# Patient Record
Sex: MALE | Race: WHITE | NOT HISPANIC OR LATINO | URBAN - METROPOLITAN AREA
[De-identification: names, ages, dates, MRNs, and addresses within clinical notes are randomized per-mention and may not be internally consistent; named-entity substitution may affect disease eponyms.]

---

## 2017-10-02 NOTE — PATIENT DISCUSSION
(H25.509) Posterior subcapsular polar age-related cataract, bilateral - Assesment : Examination revealed Posterior Subcapsular Polar Senile Cataract. - Plan : see plan #1.

## 2017-10-02 NOTE — PATIENT DISCUSSION
(M66.042) Keratoconjunct sicca, not specified as Sjogren's, bilateral - Assesment : Examination revealed Dry Eye Syndrome OU. - Plan : Monitor for changes. Advised patient to call our office with decreased vision or increased symptoms.

## 2017-10-02 NOTE — PATIENT DISCUSSION
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 - Plan : Monitor for changes. Advised patient to call our office with decreased vision or increased symptoms.

## 2017-10-02 NOTE — PATIENT DISCUSSION
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 - Plan : Monitor for changes.

## 2017-10-02 NOTE — PATIENT DISCUSSION
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 - Plan : Risks, Benefits and Alternatives were discussed with patient at length for Cataract Surgery. Visual symptoms are consistent with Cataract findings on examination and current refraction no longer provides satisfactory vision. Hx of unsuccessful monovision with CL and do not recommend for after cataract surgery. EROF lenses discussed and advised that patient may need mild readers when prolonged reading or reading in dim lighting. Advised that symfony lenses may not be as sharp as a SV distance lens. Patient wants to be mostly independent of glasses. Minimal astigmatism and does not warrant toric lenses OU. Patient understands and desires surgery. All questions answered. Risks, Benefits and Alternatives discussed at length for IOL placement. Doctor suggests EROF lens. Patient desires EROF lens. Patient will need to wear a mild reader for fine or prolonged reading or reading in dim lighting. EYE: OS IOL TYPE: EROF lens  POST OPERATIVE TARGET: [place] PACKAGE:  ROF pkge  OD to follow. RV 1 week for Ascan/See GB.

## 2017-11-08 ENCOUNTER — IMPORTED ENCOUNTER (OUTPATIENT)
Dept: URBAN - METROPOLITAN AREA CLINIC 31 | Facility: CLINIC | Age: 82
End: 2017-11-08

## 2017-11-08 PROCEDURE — 92015 DETERMINE REFRACTIVE STATE: CPT

## 2017-11-08 PROCEDURE — 92014 COMPRE OPH EXAM EST PT 1/>: CPT

## 2017-11-08 PROCEDURE — 92250 FUNDUS PHOTOGRAPHY W/I&R: CPT

## 2017-11-08 NOTE — PATIENT DISCUSSION
1.  Pseudophakia OU - IOLs stable. Capsules ou clear. MONO. Monitor. Rx for near and nite driving. Pt has BF glasses but seldom wears. 2.  DM   II -- NO BDR OU. ---  Discussed the pathophysiology of diabetes and its effect on the eye. Stressed the importance of regular followup and good control of BS BP and Lipids to avoid future complications. 3. Dermatochalasis OU:  Patient can tell his lids are drooping. . Disc eval and pt is in agreement to be evaluated. 4.  Glaucoma suspect OU - No signs of glaucomatous damage to the optic nerve based on todays examination and testing. Will continue to monitor. Right ONH larger. LVF 12/14 normal.5. [facility] ou dist [id number]. 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 7.  NO Rx changes8.   Return 1 yr CE/VF and Optos

## 2017-11-09 PROBLEM — H40.003: Noted: 2017-11-09

## 2017-11-09 PROBLEM — H35.3131: Noted: 2017-11-09

## 2017-11-09 PROBLEM — Z96.1: Noted: 2017-11-09

## 2017-11-09 PROBLEM — H02.831: Noted: 2017-11-09

## 2017-11-09 PROBLEM — E11.9: Noted: 2017-11-09

## 2017-11-09 PROBLEM — H02.834: Noted: 2017-11-09

## 2018-11-09 ENCOUNTER — IMPORTED ENCOUNTER (OUTPATIENT)
Dept: URBAN - METROPOLITAN AREA CLINIC 31 | Facility: CLINIC | Age: 83
End: 2018-11-09

## 2018-11-09 PROBLEM — E11.9: Noted: 2018-11-09

## 2018-11-09 PROBLEM — Z96.1: Noted: 2018-11-09

## 2018-11-09 PROBLEM — H40.003: Noted: 2018-11-09

## 2018-11-09 PROBLEM — H02.831: Noted: 2018-11-09

## 2018-11-09 PROBLEM — H02.834: Noted: 2018-11-09

## 2018-11-09 PROCEDURE — 92014 COMPRE OPH EXAM EST PT 1/>: CPT

## 2018-11-09 PROCEDURE — 92250 FUNDUS PHOTOGRAPHY W/I&R: CPT

## 2018-11-09 PROCEDURE — 92015 DETERMINE REFRACTIVE STATE: CPT

## 2018-11-09 PROCEDURE — 92133 CPTRZD OPH DX IMG PST SGM ON: CPT

## 2018-11-09 NOTE — PATIENT DISCUSSION
1.  Pseudophakia OU - IOLs stable. Capsules ou clear. MONO. Monitor. Rx for near and nite driving. Pt has BF glasses but seldom wears. 2.  DM   II -- NO BDR OU. ---  Discussed the pathophysiology of diabetes and its effect on the eye. Stressed the importance of regular followup and good control of BS BP and Lipids to avoid future complications. 3. S/P BULB ---7/18  4. Glaucoma suspect OU - No signs of glaucomatous damage to the optic nerve based on todays examination and testing. Will continue to monitor. Right ONH larger. LVF 12/14 normal.  OCT 11/9/18  Od borderline thinning OS normal.  RNFL 89/92. Good GCC ou. 5.  Lasik ou dist [id number]. 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 7.  NO Rx changes8.   Return 1 yr CE/VF and Optos

## 2019-11-12 ENCOUNTER — IMPORTED ENCOUNTER (OUTPATIENT)
Dept: URBAN - METROPOLITAN AREA CLINIC 31 | Facility: CLINIC | Age: 84
End: 2019-11-12

## 2019-11-12 PROBLEM — E11.9: Noted: 2019-11-12

## 2019-11-12 PROBLEM — H40.003: Noted: 2019-11-12

## 2019-11-12 PROBLEM — H02.834: Noted: 2019-11-12

## 2019-11-12 PROBLEM — Z96.1: Noted: 2019-11-12

## 2019-11-12 PROBLEM — H40.013: Noted: 2019-11-12

## 2019-11-12 PROBLEM — H02.831: Noted: 2019-11-12

## 2019-11-12 PROCEDURE — 92015 DETERMINE REFRACTIVE STATE: CPT

## 2019-11-12 PROCEDURE — 92250 FUNDUS PHOTOGRAPHY W/I&R: CPT

## 2019-11-12 PROCEDURE — 92014 COMPRE OPH EXAM EST PT 1/>: CPT

## 2019-11-12 PROCEDURE — 92083 EXTENDED VISUAL FIELD XM: CPT

## 2019-11-12 NOTE — PATIENT DISCUSSION
1.  Pseudophakia OU - IOLs stable. Capsules ou clear. MONO. Monitor. Rx for near and nite driving. Pt has BF glasses but seldom wears. 2.  DM   II -- NO BDR OU. ---  Discussed the pathophysiology of diabetes and its effect on the eye. Stressed the importance of regular followup and good control of BS BP and Lipids to avoid future complications. 3. S/P BULB ---7/18  4. Glaucoma suspect OU - Poss signs of glaucomatous damage to the optic nerve based on todays examination and testing. Will continue to monitor. Right ONH larger. LVF 11/12/19  Normal OD scattered OS. OCT 11/9/18  Od borderline thinning OS normal.  RNFL 89/92. Good GCC ou. 5.  Lasik ou dist [id number]. 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 7.  NO Rx changes8.   Return 1 yr CE/OCT nerve--FU on [address] [place] 74 and glc

## 2020-10-23 ENCOUNTER — IMPORTED ENCOUNTER (OUTPATIENT)
Dept: URBAN - METROPOLITAN AREA CLINIC 31 | Facility: CLINIC | Age: 85
End: 2020-10-23

## 2020-10-23 PROBLEM — H02.834: Noted: 2020-10-23

## 2020-10-23 PROBLEM — E11.9: Noted: 2020-10-23

## 2020-10-23 PROBLEM — H40.003: Noted: 2020-10-23

## 2020-10-23 PROBLEM — H02.831: Noted: 2020-10-23

## 2020-10-23 PROBLEM — Z96.1: Noted: 2020-10-23

## 2020-10-23 PROBLEM — H40.013: Noted: 2020-10-23

## 2020-10-23 PROCEDURE — 92014 COMPRE OPH EXAM EST PT 1/>: CPT

## 2020-10-23 PROCEDURE — 92133 CPTRZD OPH DX IMG PST SGM ON: CPT

## 2020-10-23 PROCEDURE — 92015 DETERMINE REFRACTIVE STATE: CPT

## 2020-10-23 NOTE — PATIENT DISCUSSION
1.  Pseudophakia OU - IOLs stable. Capsules ou clear. MONO. Monitor. Rx for near and nite driving. Pt has BF glasses but seldom wears. 2.  DM   II -- NO BDR OU. ---  Discussed the pathophysiology of diabetes and its effect on the eye. Stressed the importance of regular followup and good control of BS BP and Lipids to avoid future complications. 3. S/P BULB ---7/18  4. Glaucoma suspect OU - Poss signs of glaucomatous damage to the optic nerve based on todays examination and testing. Will continue to monitor. Right ONH larger. LVF 11/12/19  Normal OD scattered OS. OCT 10/23/20  OD borderline thinning OS normal.  RNFL 85/86. Good GCC ou. 5.  Lasik ou dist [id number]. 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 7.  NO Rx changes8.   RTN 1 yr CE/OPtos nerve--FU on ONH and glc

## 2021-10-22 ENCOUNTER — IMPORTED ENCOUNTER (OUTPATIENT)
Dept: URBAN - METROPOLITAN AREA CLINIC 31 | Facility: CLINIC | Age: 86
End: 2021-10-22

## 2021-10-22 PROBLEM — H40.013: Noted: 2021-10-22

## 2021-10-22 PROBLEM — H40.003: Noted: 2021-10-22

## 2021-10-22 PROBLEM — E11.9: Noted: 2021-10-22

## 2021-10-22 PROBLEM — H02.834: Noted: 2021-10-22

## 2021-10-22 PROBLEM — H02.831: Noted: 2021-10-22

## 2021-10-22 PROBLEM — Z96.1: Noted: 2021-10-22

## 2021-10-22 PROCEDURE — 92250 FUNDUS PHOTOGRAPHY W/I&R: CPT

## 2021-10-22 PROCEDURE — 92015 DETERMINE REFRACTIVE STATE: CPT

## 2021-10-22 PROCEDURE — 92014 COMPRE OPH EXAM EST PT 1/>: CPT

## 2021-10-22 PROCEDURE — 92083 EXTENDED VISUAL FIELD XM: CPT

## 2021-10-22 NOTE — PATIENT DISCUSSION
1.  Pseudophakia OU - IOLs stable. Capsules ou clear. MONO. Monitor. Rx for near and nite driving. Pt has BF glasses but seldom wears. 2.  DM II -- NO BDR OU. ---  Discussed the pathophysiology of diabetes and its effect on the eye. Stressed the importance of regular followup and good control of BS BP and Lipids to avoid future complications. 3. S/P BULB ---7/18  4. Glaucoma suspect OU - Poss signs of glaucomatous damage to the optic nerve based on todays examination and testing. Will continue to monitor. Right ONH larger. LVF 10/22/21 OD scttered sup temp OS Normal.  OCT 10/23/20  OD borderline thinning OS normal.  RNFL 85/86. Good GCC ou. 5.  Lasik ou dist [id number]. 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 7.  NO Rx changes-- may need rx for near--using magnifier8.   RTN 1 yr CE/OCT Nerve--FU on ONH and glc

## 2022-04-02 ASSESSMENT — VISUAL ACUITY
OS_CC: 20/400
OS_SC: 20/30+3
OS_CC: 20/400
OD_SC: J5
OS_SC: J2
OD_CC: 20/30
OD_CC: 20/40-1
OS_CC: 20/200
OS_CC: 20/400
OS_SC: 20/30+2
OD_CC: 20/50+1
OD_CC: 20/40-2
OD_PH: 20/25
OD_SC: 20/200
OD_PH: SC 20/40 +2
OS_CC: 20/40-1
OS_PH: SC 20/40
OD_SC: 20/100+2
OS_PH: 20/30
OD_CC: 20/50-2

## 2022-04-02 ASSESSMENT — TONOMETRY
OS_IOP_MMHG: 13
OD_IOP_MMHG: 14
OS_IOP_MMHG: 14
OD_IOP_MMHG: 14
OD_IOP_MMHG: 13
OS_IOP_MMHG: 14
OD_IOP_MMHG: 13
OD_IOP_MMHG: 14
OS_IOP_MMHG: 14
OS_IOP_MMHG: 13